# Patient Record
Sex: FEMALE | NOT HISPANIC OR LATINO | URBAN - METROPOLITAN AREA
[De-identification: names, ages, dates, MRNs, and addresses within clinical notes are randomized per-mention and may not be internally consistent; named-entity substitution may affect disease eponyms.]

---

## 2017-11-24 ENCOUNTER — EMERGENCY (EMERGENCY)
Facility: HOSPITAL | Age: 47
LOS: 1 days | Discharge: HOME | End: 2017-11-24
Admitting: EMERGENCY MEDICINE
Payer: COMMERCIAL

## 2017-11-24 VITALS — HEIGHT: 62 IN | WEIGHT: 115.08 LBS

## 2017-11-24 VITALS
OXYGEN SATURATION: 99 % | DIASTOLIC BLOOD PRESSURE: 81 MMHG | SYSTOLIC BLOOD PRESSURE: 125 MMHG | TEMPERATURE: 98 F | RESPIRATION RATE: 18 BRPM | HEART RATE: 93 BPM

## 2017-11-24 DIAGNOSIS — S61.011A LACERATION WITHOUT FOREIGN BODY OF RIGHT THUMB WITHOUT DAMAGE TO NAIL, INITIAL ENCOUNTER: ICD-10-CM
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
## 2017-11-24 PROCEDURE — 99283 EMERGENCY DEPT VISIT LOW MDM: CPT

## 2017-11-24 RX ORDER — TETANUS TOXOID, REDUCED DIPHTHERIA TOXOID AND ACELLULAR PERTUSSIS VACCINE, ADSORBED 5; 2.5; 8; 8; 2.5 [IU]/.5ML; [IU]/.5ML; UG/.5ML; UG/.5ML; UG/.5ML
0.5 SUSPENSION INTRAMUSCULAR ONCE
Qty: 0 | Refills: 0 | Status: COMPLETED | OUTPATIENT
Start: 2017-11-24 | End: 2017-11-24

## 2017-11-24 RX ORDER — IBUPROFEN 200 MG
600 TABLET ORAL ONCE
Qty: 0 | Refills: 0 | Status: COMPLETED | OUTPATIENT
Start: 2017-11-24 | End: 2017-11-24

## 2017-11-24 RX ORDER — TRAMADOL HYDROCHLORIDE 50 MG/1
25 TABLET ORAL ONCE
Qty: 0 | Refills: 0 | Status: DISCONTINUED | OUTPATIENT
Start: 2017-11-24 | End: 2017-11-24

## 2017-11-24 RX ORDER — TRAMADOL HYDROCHLORIDE 50 MG/1
50 TABLET ORAL ONCE
Qty: 0 | Refills: 0 | Status: DISCONTINUED | OUTPATIENT
Start: 2017-11-24 | End: 2017-11-24

## 2017-11-24 RX ADMIN — TRAMADOL HYDROCHLORIDE 50 MILLIGRAM(S): 50 TABLET ORAL at 22:58

## 2017-11-24 RX ADMIN — Medication 600 MILLIGRAM(S): at 22:18

## 2017-11-24 RX ADMIN — TRAMADOL HYDROCHLORIDE 50 MILLIGRAM(S): 50 TABLET ORAL at 22:51

## 2017-11-24 RX ADMIN — Medication 600 MILLIGRAM(S): at 22:48

## 2017-11-24 NOTE — ED PROVIDER NOTE - MEDICAL DECISION MAKING DETAILS
pt with avulsion laceration to the R thumb, wound copiously irrigated under high pressure and adequate hemostasis achieved with surgicel, NV intact pre and post wound care, wound care instructions provided, f/u with PMD/hand

## 2017-11-24 NOTE — ED PROVIDER NOTE - PHYSICAL EXAMINATION
Gen - WDWN, NAD, comfortable in stretcher,  non-toxic appearing  Skin - warm, dry, 1cm avulsion laceration to the tip of the R thumb with +active bleeding, no nailbed involvement, no FB or bony/tendon exposure   CV - S1S2, R/R/R  Resp - CTAB, no r/r/w   MS - w/w/p, no c/c/e, FROM, NV intact   Neuro - AxOx3, ambulatory without gait disturbance

## 2017-11-24 NOTE — ED PROCEDURE NOTE - PROCEDURE ADDITIONAL DETAILS
no skin for approximation, adequate hemostasis achieved with surgicel and tubular finger dressing, NV intact, FROM, wound care instructions provided

## 2017-11-24 NOTE — ED PROVIDER NOTE - OBJECTIVE STATEMENT
48 yo F with no known PMHx, RHD, last tetanus unknown, presenting c/o avulsion laceration to the R thumb after using a mandoline slicer 4 hrs PTA to the ED. Attempted to apply bandage but with persistent bleeding. Denies fever, chills, FB sensation, change in ROM/sensation, redness, swelling, paresthesia, purulent d/c, N/V, HA, dizziness, LOC, CP, SOB, and focal weakness

## 2017-12-06 ENCOUNTER — APPOINTMENT (OUTPATIENT)
Dept: ORTHOPEDIC SURGERY | Facility: CLINIC | Age: 47
End: 2017-12-06

## 2017-12-06 ENCOUNTER — TRANSCRIPTION ENCOUNTER (OUTPATIENT)
Age: 47
End: 2017-12-06

## 2017-12-06 PROBLEM — Z00.00 ENCOUNTER FOR PREVENTIVE HEALTH EXAMINATION: Status: ACTIVE | Noted: 2017-12-06

## 2018-03-14 ENCOUNTER — TRANSCRIPTION ENCOUNTER (OUTPATIENT)
Age: 48
End: 2018-03-14

## 2022-03-28 ENCOUNTER — TRANSCRIPTION ENCOUNTER (OUTPATIENT)
Age: 52
End: 2022-03-28

## 2022-10-30 NOTE — ED ADULT NURSE NOTE - NS ED NURSE LEVEL OF CONSCIOUSNESS SPEECH
Speaking Coherently
[facility]

## 2024-11-16 ENCOUNTER — EMERGENCY (EMERGENCY)
Facility: HOSPITAL | Age: 54
LOS: 1 days | Discharge: HOME | End: 2024-11-16
Attending: STUDENT IN AN ORGANIZED HEALTH CARE EDUCATION/TRAINING PROGRAM | Admitting: STUDENT IN AN ORGANIZED HEALTH CARE EDUCATION/TRAINING PROGRAM
Payer: COMMERCIAL

## 2024-11-16 VITALS
TEMPERATURE: 98 F | DIASTOLIC BLOOD PRESSURE: 85 MMHG | HEART RATE: 77 BPM | OXYGEN SATURATION: 99 % | SYSTOLIC BLOOD PRESSURE: 116 MMHG | RESPIRATION RATE: 18 BRPM

## 2024-11-16 DIAGNOSIS — R53.83 OTHER FATIGUE: ICD-10-CM

## 2024-11-16 PROCEDURE — 99284 EMERGENCY DEPT VISIT MOD MDM: CPT

## 2024-11-16 NOTE — ED PROVIDER NOTE - PHYSICAL EXAMINATION
Gen: WDWN, NAD  HEENT: EOMI, no nasal discharge, mucous membranes moist, no c spine ttp. + proptotic eyes b/l. pupils 4mm b/l, reactive to light. + frontoparietal scalp hematoma, no laceration, abrasion, bleeding.   CV: 2+ radial pulse R  Resp: no accessory muscle use, no increased work of breathing  GI: Abdomen soft non-distended, NTTP  MSK: no LE edema  Neuro: A&Ox3, following commands, moving all four extremities spontaneously. CN3-12 intact, EOMI. PERRLA, 5/5 strength in b/l UE/LE.  Sensation intact bl in UE/LE. normal gait  Psych: appropriate mood

## 2024-11-16 NOTE — ED PROVIDER NOTE - OBJECTIVE STATEMENT
55YO F hx hashimoto's thyroiditis, p/w LOC. episode at 8:45am today, fell from standing height to the ground, unclear if trauma to other objects. + presyncopal sxs of weakness, no cp or neuro deficits. states this episode was triggered by extreme pain to the leg / cramp, which caused anxiety-type reaction and LOC. endorses positive hx of these sxs in the past, typically vasovagal s/t blood draws or other anxiety-provoking events. no cardiac hx. currently endorses mild nausea, no vomiting, + generalized lethargy since fall. denies h/a, vision change, numbness/weakness/paresthesias to arms/legs. states gait is slightly abnormal although ambulates in ED without difficulty. at baseline follows with endocrinology for ocular issue, eyes appear proptotic, at baseline.

## 2024-11-16 NOTE — ED PROVIDER NOTE - PATIENT PORTAL LINK FT
You can access the FollowMyHealth Patient Portal offered by [facility] by registering at the following website: [email or web address]. By joining [employer]’s FollowMyHealth portal, you will also be able to view your health information using other applications (apps) compatible with our system.

## 2024-11-16 NOTE — ED PROVIDER NOTE - CARE PROVIDER_API CALL
Yayo Cartagena  Internal Medicine  [address]  [place] [address]  Phone: [phone]  Fax: [phone]  Follow Up Time: 7-10 Days

## 2024-11-16 NOTE — ED PROVIDER NOTE - CLINICAL SUMMARY MEDICAL DECISION MAKING FREE TEXT BOX
55YO F hx hashimoto's thyroiditis, p/w LOC, head strike. vss, pe no neuro deficits, + scalp hematoma. do not consider ICH given duration since event and nml neuro exam. suspect sxs s/t vasovagal given pt's hx, although will obtain screening ekg.   updates -ekg not actionable, dc ready.

## 2024-11-16 NOTE — ED PROVIDER NOTE - NSFOLLOWUPINSTRUCTIONS_ED_ALL_ED_FT
--take tylenol or motrin as needed for pain. Take tylenol 1000mg every 8 hours alternating with motrin 600 mg every 8 hours  --your ekg looks normal.  --given that you were in the ED today, we recommend a followup visit with your general doctor (primary care doctor) within 5-7 days.   --your diagnosis is: head strike.   --return to the ED if confused, more episodes of passing out, if vision changes, weakness, numbness to arms / legs.
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
## 2024-11-19 DIAGNOSIS — E06.3 AUTOIMMUNE THYROIDITIS: ICD-10-CM

## 2024-11-19 DIAGNOSIS — S00.83XA CONTUSION OF OTHER PART OF HEAD, INITIAL ENCOUNTER: ICD-10-CM

## 2024-11-19 DIAGNOSIS — S09.90XA UNSPECIFIED INJURY OF HEAD, INITIAL ENCOUNTER: ICD-10-CM
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
## 2025-01-23 NOTE — ED ADULT NURSE NOTE - PAIN: PRESENCE, MLM
Patient needs to enroll in [employer].   Talk to patient and she is going to check with her insurance and see what else she needs to do after enrollement  
complains of pain/discomfort

## 2025-07-30 ENCOUNTER — APPOINTMENT (OUTPATIENT)
Dept: OPHTHALMOLOGY | Facility: CLINIC | Age: 55
End: 2025-07-30
Payer: COMMERCIAL

## 2025-07-30 ENCOUNTER — NON-APPOINTMENT (OUTPATIENT)
Age: 55
End: 2025-07-30

## 2025-07-30 PROCEDURE — 99205 OFFICE O/P NEW HI 60 MIN: CPT

## 2025-07-30 PROCEDURE — 92285 EXTERNAL OCULAR PHOTOGRAPHY: CPT

## 2025-08-04 ENCOUNTER — TRANSCRIPTION ENCOUNTER (OUTPATIENT)
Age: 55
End: 2025-08-04

## 2025-08-08 ENCOUNTER — APPOINTMENT (OUTPATIENT)
Facility: CLINIC | Age: 55
End: 2025-08-08
Payer: COMMERCIAL

## 2025-08-08 ENCOUNTER — NON-APPOINTMENT (OUTPATIENT)
Age: 55
End: 2025-08-08

## 2025-08-08 PROCEDURE — 99214 OFFICE O/P EST MOD 30 MIN: CPT | Mod: 3W

## 2025-08-29 ENCOUNTER — NON-APPOINTMENT (OUTPATIENT)
Age: 55
End: 2025-08-29

## 2025-08-29 ENCOUNTER — APPOINTMENT (OUTPATIENT)
Facility: CLINIC | Age: 55
End: 2025-08-29

## 2025-08-29 PROCEDURE — 99214 OFFICE O/P EST MOD 30 MIN: CPT | Mod: 3W

## 2025-09-12 ENCOUNTER — NON-APPOINTMENT (OUTPATIENT)
Age: 55
End: 2025-09-12

## 2025-09-12 ENCOUNTER — APPOINTMENT (OUTPATIENT)
Dept: OPHTHALMOLOGY | Facility: CLINIC | Age: 55
End: 2025-09-12
Payer: COMMERCIAL

## 2025-09-12 PROCEDURE — 99214 OFFICE O/P EST MOD 30 MIN: CPT | Mod: 3W

## 2025-09-17 ENCOUNTER — APPOINTMENT (OUTPATIENT)
Dept: OTOLARYNGOLOGY | Facility: CLINIC | Age: 55
End: 2025-09-17
Payer: COMMERCIAL

## 2025-09-17 ENCOUNTER — NON-APPOINTMENT (OUTPATIENT)
Age: 55
End: 2025-09-17

## 2025-09-17 VITALS
WEIGHT: 130 LBS | DIASTOLIC BLOOD PRESSURE: 75 MMHG | TEMPERATURE: 98.4 F | OXYGEN SATURATION: 98 % | HEIGHT: 62 IN | HEART RATE: 73 BPM | SYSTOLIC BLOOD PRESSURE: 118 MMHG | BODY MASS INDEX: 23.92 KG/M2

## 2025-09-17 DIAGNOSIS — J34.2 DEVIATED NASAL SEPTUM: ICD-10-CM

## 2025-09-17 DIAGNOSIS — Z80.3 FAMILY HISTORY OF MALIGNANT NEOPLASM OF BREAST: ICD-10-CM

## 2025-09-17 DIAGNOSIS — Z78.0 ASYMPTOMATIC MENOPAUSAL STATE: ICD-10-CM

## 2025-09-17 DIAGNOSIS — Z80.1 FAMILY HISTORY OF MALIGNANT NEOPLASM OF TRACHEA, BRONCHUS AND LUNG: ICD-10-CM

## 2025-09-17 DIAGNOSIS — J34.3 HYPERTROPHY OF NASAL TURBINATES: ICD-10-CM

## 2025-09-17 DIAGNOSIS — H05.20 UNSPECIFIED EXOPHTHALMOS: ICD-10-CM

## 2025-09-17 DIAGNOSIS — E06.3 AUTOIMMUNE THYROIDITIS: ICD-10-CM

## 2025-09-17 DIAGNOSIS — H57.89 OTHER SPECIFIED DISORDERS OF EYE AND ADNEXA: ICD-10-CM

## 2025-09-17 DIAGNOSIS — J34.89 OTHER SPECIFIED DISORDERS OF NOSE AND NASAL SINUSES: ICD-10-CM

## 2025-09-17 DIAGNOSIS — E07.9 OTHER SPECIFIED DISORDERS OF EYE AND ADNEXA: ICD-10-CM

## 2025-09-17 PROCEDURE — 99204 OFFICE O/P NEW MOD 45 MIN: CPT | Mod: 25

## 2025-09-17 PROCEDURE — 31231 NASAL ENDOSCOPY DX: CPT

## 2025-09-17 RX ORDER — ESTRADIOL 10 UG/1
TABLET, FILM COATED VAGINAL
Refills: 0 | Status: ACTIVE | COMMUNITY

## 2025-09-17 RX ORDER — LEVOTHYROXINE SODIUM 0.17 MG/1
TABLET ORAL
Refills: 0 | Status: ACTIVE | COMMUNITY

## 2025-09-17 RX ORDER — PROGESTERONE 100 MG/1
100 CAPSULE ORAL
Refills: 0 | Status: ACTIVE | COMMUNITY

## 2025-09-19 PROBLEM — H57.89 THYROID EYE DISEASE: Status: ACTIVE | Noted: 2025-09-19

## 2025-09-19 PROBLEM — J34.2 DNS (DEVIATED NASAL SEPTUM): Status: ACTIVE | Noted: 2025-09-19
